# Patient Record
Sex: FEMALE | Race: BLACK OR AFRICAN AMERICAN | NOT HISPANIC OR LATINO | Employment: FULL TIME | URBAN - METROPOLITAN AREA
[De-identification: names, ages, dates, MRNs, and addresses within clinical notes are randomized per-mention and may not be internally consistent; named-entity substitution may affect disease eponyms.]

---

## 2020-07-19 ENCOUNTER — HOSPITAL ENCOUNTER (EMERGENCY)
Facility: HOSPITAL | Age: 65
Discharge: HOME | End: 2020-07-19
Attending: EMERGENCY MEDICINE
Payer: COMMERCIAL

## 2020-07-19 VITALS
BODY MASS INDEX: 34.66 KG/M2 | OXYGEN SATURATION: 96 % | HEART RATE: 72 BPM | RESPIRATION RATE: 25 BRPM | TEMPERATURE: 100 F | HEIGHT: 65 IN | SYSTOLIC BLOOD PRESSURE: 139 MMHG | WEIGHT: 208 LBS | DIASTOLIC BLOOD PRESSURE: 75 MMHG

## 2020-07-19 DIAGNOSIS — Z20.822 SUSPECTED COVID-19 VIRUS INFECTION: ICD-10-CM

## 2020-07-19 DIAGNOSIS — R05.9 COUGH: Primary | ICD-10-CM

## 2020-07-19 LAB
ALBUMIN SERPL BCP-MCNC: 3.2 G/DL (ref 3.5–5.2)
ALP SERPL-CCNC: 57 U/L (ref 55–135)
ALT SERPL W/O P-5'-P-CCNC: 10 U/L (ref 10–44)
ANION GAP SERPL CALC-SCNC: 11 MMOL/L (ref 8–16)
AST SERPL-CCNC: 25 U/L (ref 10–40)
BASOPHILS # BLD AUTO: 0.02 K/UL (ref 0–0.2)
BASOPHILS NFR BLD: 0.3 % (ref 0–1.9)
BILIRUB SERPL-MCNC: 0.4 MG/DL (ref 0.1–1)
BUN SERPL-MCNC: 14 MG/DL (ref 8–23)
CALCIUM SERPL-MCNC: 9 MG/DL (ref 8.7–10.5)
CHLORIDE SERPL-SCNC: 98 MMOL/L (ref 95–110)
CK SERPL-CCNC: 84 U/L (ref 20–180)
CO2 SERPL-SCNC: 25 MMOL/L (ref 23–29)
CREAT SERPL-MCNC: 1.1 MG/DL (ref 0.5–1.4)
CRP SERPL-MCNC: 108.1 MG/L (ref 0–8.2)
DIFFERENTIAL METHOD: ABNORMAL
EOSINOPHIL # BLD AUTO: 0 K/UL (ref 0–0.5)
EOSINOPHIL NFR BLD: 0.3 % (ref 0–8)
ERYTHROCYTE [DISTWIDTH] IN BLOOD BY AUTOMATED COUNT: 12.9 % (ref 11.5–14.5)
EST. GFR  (AFRICAN AMERICAN): >60 ML/MIN/1.73 M^2
EST. GFR  (NON AFRICAN AMERICAN): 53 ML/MIN/1.73 M^2
FERRITIN SERPL-MCNC: 212 NG/ML (ref 20–300)
GLUCOSE SERPL-MCNC: 148 MG/DL (ref 70–110)
HCT VFR BLD AUTO: 35.3 % (ref 37–48.5)
HGB BLD-MCNC: 11.1 G/DL (ref 12–16)
IMM GRANULOCYTES # BLD AUTO: 0.02 K/UL (ref 0–0.04)
IMM GRANULOCYTES NFR BLD AUTO: 0.3 % (ref 0–0.5)
LACTATE SERPL-SCNC: 1 MMOL/L (ref 0.5–2.2)
LDH SERPL L TO P-CCNC: 254 U/L (ref 110–260)
LYMPHOCYTES # BLD AUTO: 1.7 K/UL (ref 1–4.8)
LYMPHOCYTES NFR BLD: 23.3 % (ref 18–48)
MCH RBC QN AUTO: 27.4 PG (ref 27–31)
MCHC RBC AUTO-ENTMCNC: 31.4 G/DL (ref 32–36)
MCV RBC AUTO: 87 FL (ref 82–98)
MONOCYTES # BLD AUTO: 0.5 K/UL (ref 0.3–1)
MONOCYTES NFR BLD: 7.4 % (ref 4–15)
NEUTROPHILS # BLD AUTO: 4.9 K/UL (ref 1.8–7.7)
NEUTROPHILS NFR BLD: 68.4 % (ref 38–73)
NRBC BLD-RTO: 0 /100 WBC
PLATELET # BLD AUTO: 234 K/UL (ref 150–350)
PMV BLD AUTO: 9.3 FL (ref 9.2–12.9)
POTASSIUM SERPL-SCNC: 4.6 MMOL/L (ref 3.5–5.1)
PROCALCITONIN SERPL IA-MCNC: 0.07 NG/ML
PROT SERPL-MCNC: 7.2 G/DL (ref 6–8.4)
RBC # BLD AUTO: 4.05 M/UL (ref 4–5.4)
SODIUM SERPL-SCNC: 134 MMOL/L (ref 136–145)
TROPONIN I SERPL DL<=0.01 NG/ML-MCNC: <0.006 NG/ML (ref 0–0.03)
WBC # BLD AUTO: 7.13 K/UL (ref 3.9–12.7)

## 2020-07-19 PROCEDURE — 84145 PROCALCITONIN (PCT): CPT

## 2020-07-19 PROCEDURE — 82550 ASSAY OF CK (CPK): CPT

## 2020-07-19 PROCEDURE — 86140 C-REACTIVE PROTEIN: CPT

## 2020-07-19 PROCEDURE — 83605 ASSAY OF LACTIC ACID: CPT

## 2020-07-19 PROCEDURE — 80053 COMPREHEN METABOLIC PANEL: CPT

## 2020-07-19 PROCEDURE — 84484 ASSAY OF TROPONIN QUANT: CPT

## 2020-07-19 PROCEDURE — 83615 LACTATE (LD) (LDH) ENZYME: CPT

## 2020-07-19 PROCEDURE — 82728 ASSAY OF FERRITIN: CPT

## 2020-07-19 PROCEDURE — 93010 ELECTROCARDIOGRAM REPORT: CPT | Mod: ,,, | Performed by: INTERNAL MEDICINE

## 2020-07-19 PROCEDURE — 99285 EMERGENCY DEPT VISIT HI MDM: CPT | Mod: 25

## 2020-07-19 PROCEDURE — 93005 ELECTROCARDIOGRAM TRACING: CPT

## 2020-07-19 PROCEDURE — 93010 EKG 12-LEAD: ICD-10-PCS | Mod: ,,, | Performed by: INTERNAL MEDICINE

## 2020-07-19 PROCEDURE — 85025 COMPLETE CBC W/AUTO DIFF WBC: CPT

## 2020-07-19 RX ORDER — BENZONATATE 100 MG/1
100 CAPSULE ORAL 3 TIMES DAILY PRN
Qty: 20 CAPSULE | Refills: 0 | Status: SHIPPED | OUTPATIENT
Start: 2020-07-19 | End: 2020-07-29

## 2020-07-19 NOTE — ED PROVIDER NOTES
"Encounter Date: 7/19/2020    SCRIBE #1 NOTE: I, Annie Monroy, am scribing for, and in the presence of,  Lanette Carrillo MD. I have scribed the following portions of the note - Other sections scribed: HPI, ROS, PE.       History     Chief Complaint   Patient presents with    COVID-19 Concerns     Pt reports she was dx with covid-19 on 7/15/2020. Pt reports her oxygen at home has been dropping to the 80s and she was told by her doctor to go to ED if this starts to happen. O2 sat 97% at rest in triage. Pt c/o cough. Denies pain at this time     CC: Cough    HPI: This is a 64 y.o. female with a PMHx of hypertension, hyperlipidemia, and diabetes mellitus who presents to the Emergency Department with a cc of a cough for about 1.5 weeks. Patient states she was diagnosed with COVID-19 four days ago (7/15) and had been experiencing symptoms for about one week prior. She also notes her O2 sats had been intermittently dropping into the 80s on her home device but reports "it is all over the place." She reports associated mild shortness of breath with exertion and fatigue while outside that was alleviated with water. The SOB has been improving.  Denies nausea, vomiting, diarrhea, chest pain, or abdominal pain. Patient states her symptoms have been progressively improving. She is not a smoker and has no known drug allergies.    The history is provided by the patient. No  was used.     Review of patient's allergies indicates:  No Known Allergies  Past Medical History:   Diagnosis Date    Arthritis     Cataract     Diabetes mellitus     Diabetic retinopathy     Glaucoma     Hypertension      Past Surgical History:   Procedure Laterality Date    CATARACT EXTRACTION      DILATION AND CURETTAGE OF UTERUS      dnc      EYE SURGERY       Family History   Problem Relation Age of Onset    Cataracts Mother     Diabetes Mother     Hypertension Mother     Stroke Father     Thyroid disease Sister     " Heart disease Paternal Grandfather     Amblyopia Neg Hx     Blindness Neg Hx     Cancer Neg Hx     Glaucoma Neg Hx     Macular degeneration Neg Hx     Retinal detachment Neg Hx     Strabismus Neg Hx      Social History     Tobacco Use    Smoking status: Never Smoker   Substance Use Topics    Alcohol use: No    Drug use: No     Review of Systems   Constitutional: Positive for fatigue. Negative for chills, diaphoresis and fever.   HENT: Negative for congestion, rhinorrhea and sore throat.    Eyes: Negative for photophobia and visual disturbance.   Respiratory: Positive for cough and shortness of breath.    Cardiovascular: Negative for chest pain and leg swelling.   Gastrointestinal: Negative for abdominal pain, blood in stool, constipation, diarrhea, nausea and vomiting.   Genitourinary: Negative for dysuria, flank pain, frequency, hematuria and urgency.   Musculoskeletal: Negative for back pain, neck pain and neck stiffness.   Skin: Negative for rash and wound.   Neurological: Negative for dizziness, weakness, light-headedness, numbness and headaches.   Psychiatric/Behavioral: Negative for confusion and suicidal ideas.   All other systems reviewed and are negative.      Physical Exam     Initial Vitals [07/19/20 1236]   BP Pulse Resp Temp SpO2   129/67 82 18 98.8 °F (37.1 °C) 97 %      MAP       --         Physical Exam    Nursing note and vitals reviewed.  Constitutional: She appears well-developed and well-nourished. She is not diaphoretic. No distress.   HENT:   Head: Normocephalic and atraumatic.   Right Ear: External ear normal.   Left Ear: External ear normal.   Mouth/Throat: Oropharynx is clear and moist. No oropharyngeal exudate.   Eyes: Conjunctivae and EOM are normal. Pupils are equal, round, and reactive to light. Right eye exhibits no discharge. Left eye exhibits no discharge.   Neck: Normal range of motion. Neck supple. No JVD present.   Cardiovascular: Normal rate, regular rhythm, normal heart  sounds and intact distal pulses. Exam reveals no gallop and no friction rub.    No murmur heard.  Pulmonary/Chest: Breath sounds normal. No respiratory distress. She has no wheezes. She has no rhonchi. She has no rales.   O2 sats are between 99 and 100% on room air, no respiratory distress.    Abdominal: Soft. Bowel sounds are normal. She exhibits no distension. There is no abdominal tenderness. There is no rebound and no guarding.   Musculoskeletal: Normal range of motion. No tenderness or edema.   Lymphadenopathy:     She has no cervical adenopathy.   Neurological: She is alert and oriented to person, place, and time. No cranial nerve deficit.   Skin: Skin is warm and dry. Capillary refill takes less than 2 seconds.   Psychiatric: She has a normal mood and affect. Her behavior is normal. Thought content normal.         ED Course   Procedures  Labs Reviewed   CBC W/ AUTO DIFFERENTIAL - Abnormal; Notable for the following components:       Result Value    Hemoglobin 11.1 (*)     Hematocrit 35.3 (*)     Mean Corpuscular Hemoglobin Conc 31.4 (*)     All other components within normal limits   COMPREHENSIVE METABOLIC PANEL - Abnormal; Notable for the following components:    Sodium 134 (*)     Glucose 148 (*)     Albumin 3.2 (*)     eGFR if non  53 (*)     All other components within normal limits   C-REACTIVE PROTEIN - Abnormal; Notable for the following components:    .1 (*)     All other components within normal limits   FERRITIN   LACTATE DEHYDROGENASE   CK   LACTIC ACID, PLASMA   TROPONIN I   PROCALCITONIN     EKG Readings: (Independently Interpreted)   Normal sinus rhythm at 75 no ST elevation or depression.  Normal axis.  Normal intervals.  QTC is 406.  T-wave inversion is 3 and V1.     ECG Results          EKG 12-lead (In process)  Result time 07/19/20 16:49:02    In process by Interface, Lab In [facility] (07/19/20 16:49:02)                 Narrative:    Test Reason : R68.89,    Vent. Rate  : 075 BPM     Atrial Rate : 075 BPM     P-R Int : 184 ms          QRS Dur : 080 ms      QT Int : 364 ms       P-R-T Axes : 053 042 024 degrees     QTc Int : 406 ms    Normal sinus rhythm  Normal ECG  When compared with ECG of 29-NOV-2012 14:31,  No significant change was found    Referred By: [email or web address]   SELF           Confirmed By:                   In process by Interface, Lab In [place] (07/19/20 16:41:22)                 Narrative:    Test Reason : R68.89,    Vent. Rate : 075 BPM     Atrial Rate : 075 BPM     P-R Int : 184 ms          QRS Dur : 080 ms      QT Int : 364 ms       P-R-T Axes : 053 042 024 degrees     QTc Int : 406 ms    Normal sinus rhythm  Normal ECG  When compared with ECG of 29-NOV-2012 14:31,  No significant change was found    Referred By: [email or web address]   SELF           Confirmed By:                             Imaging Results           X-Ray Chest AP Portable (Final result)  Result time 07/19/20 13:55:03    Final result by Lasha Cohen MD (07/19/20 13:55:03)                 Impression:      No convincing radiographic evidence of pneumonia or other source of cough/shortness of breath, noting that early/mild viral pneumonia may be radiographically occult.  Specifically, no focal consolidation.    Right lung base interval 2 small nodules which could represent vessels on end but remains indeterminate.  Further evaluation with elective/nonemergent CT thorax can be obtained, if 2 year stability cannot be confirmed.    This report was flagged in Epic as abnormal.      Electronically signed by: Lasha Cohen MD  Date:    07/19/2020  Time:    13:55             Narrative:    EXAMINATION:  XR CHEST AP PORTABLE    CLINICAL HISTORY:  Suspected Covid-19 Virus Infection;    TECHNIQUE:  Single frontal view of the chest was performed.    COMPARISON:  Chest radiograph 11/29/2012    FINDINGS:  Monitoring leads overlie the chest.  Resolution is limited by body habitus with underpenetration.  Chronic mild  nonspecific elevation of the right hemidiaphragm.  Cardiomediastinal silhouette is midline and mildly prominent similar to prior, without evidence of failure.  Suspected minimal calcific atherosclerosis of the arch and slight tortuosity of the aorta.  Two small nodular densities projected over the right lung base not definitively seen on prior.  Left basilar linear opacities suggesting subsegmental scarring versus atelectasis.  The lungs are otherwise well expanded without large consolidation, pleural effusion or pneumothorax.  No acute osseous process seen.                            MDM  MDM:    64 y.o.female with PMHx as noted above presents with Covid +, concerned that O2 sats were reading low on exam. Physical exam remarkable for non-distressed appearing female, conversing with ease, no peripheral edema noted, abdomen soft, nt/nd, normal rate, in no overt respiratory distress.  ED workup remarkable for CXR -unremarkable however possible nodule, I discussed this with patient and recommended need for CT scan with PCP to rule out cancer.  Pt presentation consistent with COVID19 infection, with symptoms consistent as noted above.  At this time given patient's history, physical exam, and ED workup do not suspect acute PE, acute MI/ACS, PTX, CHF/COPD exacerbation, bacterial PNA, septic shock, acute respiratory failure, or any further malignant cause.      Patient home O2 finger monitor inconsistent on our SPO2 monitor. Patient states when in the 80s at home, she was moving her hand around and cooking. Think possibly inaccurate. Patient was ambulated multiple times with no hypoxia or respiratory distress noted. She was monitored for multiple hours in our ED without respiratory distress or hypoxia. I discussed this with patient proper use of home O2 monitor. Very strict return precautions given for patient for any shortness of breath, or SPO2 decrease at home with proper use of home SPO2 monitor. Patient is in  agreement with plan and does not want to be admitted at this time, will go home and monitor symptoms at home.     Pt advised on conservative therapies at home including tylenol, increased PO fluids.  At time of reassessment patient in no acute respiratory distress, not meeting criteria for hospitalization or further oxygen administration. Discussed diagnosis and further treatment with patient, including f/u with PCP in the next few days.  Return precautions given and all questions answered.  Patient in understanding of plan, including plans for home quarantine.  Pt discharged to home improved and stable.                    Scribe Attestation:   Scribe #1: I performed the above scribed service and the documentation accurately describes the services I performed. I attest to the accuracy of the note.                          Clinical Impression:     1. Cough    2. Suspected Covid-19 Virus Infection                ED Disposition Condition    Discharge Stable        ED Prescriptions     Medication Sig Dispense Start Date End Date Auth. Provider    benzonatate (TESSALON) 100 MG capsule Take 1 capsule (100 mg total) by mouth 3 (three) times daily as needed for Cough. 20 capsule 7/19/2020 7/29/2020 Lanette Carrillo MD        Follow-up Information     Follow up With Specialties Details Why Contact Info    Marlen Bruce MD Family Medicine Schedule an appointment as soon as possible for a visit in 2 days to discuss recent ED visit, to establish primary doctor [address] [facility] [address]  [facility] [address]  [phone]      [facility] Emergency Medicine  As needed, If symptoms worsen [address] [place] [id number] [phone]                      I, Lanette Carrillo, personally performed the services described in this documentation. All medical record entries made by the scribe were at my direction and in my presence. I have reviewed the chart and agree that the record reflects my  personal performance and is accurate and complete.                 Lanette Carrillo MD  07/19/20 2047

## 2020-07-19 NOTE — DISCHARGE INSTRUCTIONS
Please return for oxygen saturations < 92%, any worsening shortness of breath, or any other concerns. Please call your primary care doctor tomorrow to discuss recent ED visit. Please isolate until symptoms resolve, at least 10 days from when symptoms started and 72 hours from your last fever (without using fever reducing medicines).     Thank you for coming to our Emergency Department today. It is important to remember that some problems are difficult to diagnose and may not be found during your first visit. Be sure to follow up with your primary care doctor and review any labs/imaging that was performed with them. If you do not have a primary care doctor, you may contact the one listed on your discharge paperwork or you may also call the [facility] Appointment Desk at [phone] to schedule an appointment with one.     All medications may potentially have side effects and it is impossible to predict which medications may give you side effects. If you feel that you are having a negative effect of any medication you should immediately stop taking them and seek medical attention.    Return to the ER with any questions/concerns, new/concerning symptoms, worsening or failure to improve. Do not drive or make any important decisions for 24 hours if you have received any pain medications, sedatives or mood altering drugs during your ER visit.

## 2020-07-19 NOTE — ED TRIAGE NOTES
Pt presents to ED with c/o COVID concerns. Pt tested positive 4 days ago. Pt states O2 sats having been intermittently dropping to the 80s. Pt states SOB with ADLs. Pt room air baseline. Pt also complaining of cough that is sometimes productive. Pt's PCP told pt to come to ED. Pt denies CP, fever, chills, or n/v/d.

## 2020-07-19 NOTE — ED NOTES
Pt ambulatory to discharge, NAD. Pt received one rx for tessalon pearls. Pt verbalized understanding of discharge instructions and of symptoms that would warrant return to the ED. All personal belongings with the patient at the time of discharge.

## 2022-06-17 ENCOUNTER — HOSPITAL ENCOUNTER (EMERGENCY)
Facility: HOSPITAL | Age: 67
Discharge: HOME | End: 2022-06-17
Attending: EMERGENCY MEDICINE
Payer: MEDICARE

## 2022-06-17 VITALS
HEIGHT: 64 IN | TEMPERATURE: 98 F | HEART RATE: 74 BPM | WEIGHT: 140 LBS | DIASTOLIC BLOOD PRESSURE: 70 MMHG | SYSTOLIC BLOOD PRESSURE: 149 MMHG | BODY MASS INDEX: 23.9 KG/M2 | RESPIRATION RATE: 18 BRPM | OXYGEN SATURATION: 98 %

## 2022-06-17 DIAGNOSIS — S00.03XA CONTUSION OF SCALP, INITIAL ENCOUNTER: Primary | ICD-10-CM

## 2022-06-17 PROCEDURE — 99284 EMERGENCY DEPT VISIT MOD MDM: CPT | Mod: 25

## 2022-06-18 NOTE — ED PROVIDER NOTES
Encounter Date: 6/17/2022       History     Chief Complaint   Patient presents with    Fall     Pt c/c fall from standing position backwards hitting head on bricks. Pt denies LOC, visual changes, bleeding or blood thinner use. Pt c/c slight headache and bump on back of head.     66-YEAR-OLD FEMALE WITH HISTORY OF HYPERTENSION, DIABETES, DIABETIC RETINOPATHY, GLAUCOMA PRESENTS COMPLAINING FALLING AND STRIKING HER HEAD.  IT WAS A SLIP AND FALL.  SHE DID NOT LOSE CONSCIOUS.  NO CHANGE IN VISION.  NO NUMBNESS OR TINGLING IN ARMS OR LEGS.  NO NECK OR BACK PAIN.  NO CHEST PAIN OR SHORTNESS OF BREATH.  NO GI SYMPTOMS.  HEADACHE IS 2/10.  DENIES TAKING ASPIRIN DAILY OR BEING ON BLOOD THINNERS.        Review of patient's allergies indicates:  No Known Allergies  Past Medical History:   Diagnosis Date    Arthritis     Cataract     Diabetes mellitus     Diabetic retinopathy     Glaucoma     Hypertension      Past Surgical History:   Procedure Laterality Date    CATARACT EXTRACTION      DILATION AND CURETTAGE OF UTERUS      dnc      EYE SURGERY       Family History   Problem Relation Age of Onset    Cataracts Mother     Diabetes Mother     Hypertension Mother     Stroke Father     Thyroid disease Sister     Heart disease Paternal Grandfather     Amblyopia Neg Hx     Blindness Neg Hx     Cancer Neg Hx     Glaucoma Neg Hx     Macular degeneration Neg Hx     Retinal detachment Neg Hx     Strabismus Neg Hx      Social History     Tobacco Use    Smoking status: Never Smoker    Smokeless tobacco: Never Used   Substance Use Topics    Alcohol use: No    Drug use: No     Review of Systems   Constitutional: Negative for fever.   HENT: Negative for sore throat.    Eyes: Negative for visual disturbance.   Respiratory: Negative for shortness of breath.    Cardiovascular: Negative for chest pain.   Gastrointestinal: Negative for abdominal pain.   Genitourinary: Negative for difficulty urinating.   Musculoskeletal:  Negative for back pain.   Skin: Negative for rash.   Neurological: Positive for headaches. Negative for dizziness, tremors, seizures, syncope, speech difficulty, weakness, light-headedness and numbness.       Physical Exam     Initial Vitals [06/17/22 2032]   BP Pulse Resp Temp SpO2   (!) 142/84 104 18 98.2 °F (36.8 °C) 97 %      MAP       --         Physical Exam    Nursing note and vitals reviewed.  Constitutional: She appears well-developed and well-nourished.   HENT:   Right Ear: External ear normal.   Left Ear: External ear normal.   Nose: Nose normal.   Mouth/Throat: Oropharynx is clear and moist.   OCCIPITAL HEMATOMA.  NO LACERATION.   Eyes: EOM are normal. Pupils are equal, round, and reactive to light.   Neck: Neck supple. No thyromegaly present. No JVD present.   NO MIDLINE TENDERNESS.  FULL RANGE OF MOTION.   Normal range of motion.  Cardiovascular: Normal rate, regular rhythm, normal heart sounds and intact distal pulses. Exam reveals no gallop and no friction rub.    No murmur heard.  Pulmonary/Chest: Breath sounds normal. No respiratory distress. She exhibits no tenderness.   Abdominal: Abdomen is soft. Bowel sounds are normal. There is no abdominal tenderness.   Musculoskeletal:         General: No tenderness or edema. Normal range of motion.      Cervical back: Normal range of motion and neck supple.     Neurological: She is alert and oriented to person, place, and time. She has normal strength and normal reflexes. She displays normal reflexes. No cranial nerve deficit or sensory deficit.   Skin: Skin is warm and dry. Capillary refill takes less than 2 seconds.         ED Course   Procedures  Labs Reviewed - No data to display       Imaging Results          CT Head Without Contrast (Final result)  Result time 06/17/22 22:48:44    Final result by Jordan Hobbs MD (06/17/22 22:48:44)                 Impression:      No acute intracranial abnormalities identified.      Electronically signed  by: Jordan Hobbs MD  Date:    06/17/2022  Time:    22:48             Narrative:    EXAMINATION:  CT HEAD WITHOUT CONTRAST    CLINICAL HISTORY:  Head trauma, minor (Age >= 65y);    TECHNIQUE:  Low dose axial images were obtained through the head.  Coronal and sagittal reformations were also performed. Contrast was not administered.    COMPARISON:  None.    FINDINGS:  No evidence of acute/recent major vascular distribution cerebral infarction, intraparenchymal hemorrhage, or intra-axial space occupying lesion. The ventricular system is normal in size and configuration with no evidence of hydrocephalus. No effacement of the skull-base cisterns. No abnormal extra-axial fluid collections or blood products. Visualized paranasal sinuses and mastoid air cells are clear. The calvarium shows no significant abnormality.                            PATIENT DOES NOT MEET CRITERIA FOR HEAD CT OTHER THAN SHE IS OLDER THAN 65 YEARS OLD.  PATIENT STATES SHE IS UNCOMFORTABLE SELF OBSERVING AT HOME AND IS REQUESTING IMAGING.  WILL OBTAIN CT HEAD.  EXPECTED TO BE NORMAL.  WILL BE FOLLOWED BY ONCOMING PROVIDER.  I WILL INFORM THE PATIENT THAT SHE WILL BE DISCHARGED THE HEAD CT IS NORMAL.  BUT THAT SHE WILL HAVE THE ABILITY TO ASK QUESTIONS IF SHE HAS ANY AND I WILL TELL THE ONCOMING PROVIDER ABOUT HER IN CASE SHE WISHES SPEAK WITH THEM     Medications - No data to display                    Assumed care patient from Dr. Holder, pending CT scan.  CT scan unremarkable.  Patient without any gross motor noted neuro deficit, stable gait, mild tenderness occipitally.  Advised on further symptomatic therapies at home.  Do not suspect any acute surgical or medical emergency.  Patient discharged home in stable condition.         Clinical Impression:   Final diagnoses:  [S00.03XA] Contusion of scalp, initial encounter (Primary)          ED Disposition Condition    Discharge Stable        ED Prescriptions     None        Follow-up Information      Follow up With Specialties Details Why Contact Info    [facility] Emergency Dept Emergency Medicine  As needed [address]  [facility] [address] [phone]           Van Holder MD  06/17/22 222       Efrain Lamb MD  06/18/22 1930

## 2022-06-18 NOTE — ED TRIAGE NOTES
Patient reports to ED after fall, approx at 6:45 PM. Patient states she tripped over rocks and fell backwards, hitting her head on a brick wall.    Patient complaining of posterior head and neck pain, pain 2/10.     Patient denies LOC and use of blood thinners.

## 2023-02-01 LAB — CRC RECOMMENDATION EXT: NORMAL

## 2024-05-29 LAB — BMD RECOMMENDATION EXT: NORMAL

## 2025-04-14 LAB — BCS RECOMMENDATION EXT: NORMAL

## 2025-05-28 DIAGNOSIS — Z78.0 MENOPAUSE: ICD-10-CM

## 2025-05-28 DIAGNOSIS — I10 IDIOPATHIC HYPERTENSION: ICD-10-CM

## 2025-05-29 ENCOUNTER — LAB VISIT (OUTPATIENT)
Dept: LAB | Facility: HOSPITAL | Age: 70
End: 2025-05-29
Attending: INTERNAL MEDICINE
Payer: MEDICARE

## 2025-05-29 ENCOUNTER — RESULTS FOLLOW-UP (OUTPATIENT)
Dept: FAMILY MEDICINE | Facility: CLINIC | Age: 70
End: 2025-05-29

## 2025-05-29 DIAGNOSIS — I10 IDIOPATHIC HYPERTENSION: ICD-10-CM

## 2025-05-29 LAB
ALBUMIN SERPL BCP-MCNC: 3.6 G/DL (ref 3.5–5.2)
ALP SERPL-CCNC: 68 UNIT/L (ref 40–150)
ALT SERPL W/O P-5'-P-CCNC: 16 UNIT/L (ref 10–44)
ANION GAP (OHS): 10 MMOL/L (ref 8–16)
AST SERPL-CCNC: 22 UNIT/L (ref 11–45)
BILIRUB SERPL-MCNC: 0.3 MG/DL (ref 0.1–1)
BUN SERPL-MCNC: 16 MG/DL (ref 8–23)
CALCIUM SERPL-MCNC: 9 MG/DL (ref 8.7–10.5)
CHLORIDE SERPL-SCNC: 110 MMOL/L (ref 95–110)
CO2 SERPL-SCNC: 22 MMOL/L (ref 23–29)
CREAT SERPL-MCNC: 1 MG/DL (ref 0.5–1.4)
GFR SERPLBLD CREATININE-BSD FMLA CKD-EPI: >60 ML/MIN/1.73/M2
GLUCOSE SERPL-MCNC: 107 MG/DL (ref 70–110)
POTASSIUM SERPL-SCNC: 4.4 MMOL/L (ref 3.5–5.1)
PROT SERPL-MCNC: 7.4 GM/DL (ref 6–8.4)
SODIUM SERPL-SCNC: 142 MMOL/L (ref 136–145)

## 2025-05-29 PROCEDURE — 80053 COMPREHEN METABOLIC PANEL: CPT

## 2025-05-29 PROCEDURE — 36415 COLL VENOUS BLD VENIPUNCTURE: CPT

## 2025-06-03 ENCOUNTER — OFFICE VISIT (OUTPATIENT)
Dept: FAMILY MEDICINE | Facility: CLINIC | Age: 70
End: 2025-06-03
Payer: MEDICARE

## 2025-06-03 ENCOUNTER — LAB VISIT (OUTPATIENT)
Dept: LAB | Facility: HOSPITAL | Age: 70
End: 2025-06-03
Attending: INTERNAL MEDICINE
Payer: MEDICARE

## 2025-06-03 VITALS
TEMPERATURE: 98 F | SYSTOLIC BLOOD PRESSURE: 120 MMHG | DIASTOLIC BLOOD PRESSURE: 70 MMHG | HEART RATE: 64 BPM | OXYGEN SATURATION: 97 % | BODY MASS INDEX: 41.66 KG/M2 | WEIGHT: 242.75 LBS

## 2025-06-03 DIAGNOSIS — Z11.59 NEED FOR HEPATITIS C SCREENING TEST: ICD-10-CM

## 2025-06-03 DIAGNOSIS — E11.00 TYPE 2 DIABETES MELLITUS WITH HYPEROSMOLARITY WITHOUT COMA, WITHOUT LONG-TERM CURRENT USE OF INSULIN: ICD-10-CM

## 2025-06-03 DIAGNOSIS — E78.5 HYPERLIPIDEMIA ASSOCIATED WITH TYPE 2 DIABETES MELLITUS: ICD-10-CM

## 2025-06-03 DIAGNOSIS — I10 HTN (HYPERTENSION), BENIGN: ICD-10-CM

## 2025-06-03 DIAGNOSIS — E11.69 HYPERLIPIDEMIA ASSOCIATED WITH TYPE 2 DIABETES MELLITUS: ICD-10-CM

## 2025-06-03 DIAGNOSIS — Z00.00 ANNUAL PHYSICAL EXAM: Primary | ICD-10-CM

## 2025-06-03 PROCEDURE — 1126F AMNT PAIN NOTED NONE PRSNT: CPT | Mod: CPTII,S$GLB,, | Performed by: INTERNAL MEDICINE

## 2025-06-03 PROCEDURE — 99387 INIT PM E/M NEW PAT 65+ YRS: CPT | Mod: S$GLB,,, | Performed by: INTERNAL MEDICINE

## 2025-06-03 PROCEDURE — 1101F PT FALLS ASSESS-DOCD LE1/YR: CPT | Mod: CPTII,S$GLB,, | Performed by: INTERNAL MEDICINE

## 2025-06-03 PROCEDURE — 3288F FALL RISK ASSESSMENT DOCD: CPT | Mod: CPTII,S$GLB,, | Performed by: INTERNAL MEDICINE

## 2025-06-03 PROCEDURE — 1160F RVW MEDS BY RX/DR IN RCRD: CPT | Mod: CPTII,S$GLB,, | Performed by: INTERNAL MEDICINE

## 2025-06-03 PROCEDURE — 3078F DIAST BP <80 MM HG: CPT | Mod: CPTII,S$GLB,, | Performed by: INTERNAL MEDICINE

## 2025-06-03 PROCEDURE — 3074F SYST BP LT 130 MM HG: CPT | Mod: CPTII,S$GLB,, | Performed by: INTERNAL MEDICINE

## 2025-06-03 PROCEDURE — 4010F ACE/ARB THERAPY RXD/TAKEN: CPT | Mod: CPTII,S$GLB,, | Performed by: INTERNAL MEDICINE

## 2025-06-03 PROCEDURE — 99999 PR PBB SHADOW E&M-EST. PATIENT-LVL IV: CPT | Mod: PBBFAC,,, | Performed by: INTERNAL MEDICINE

## 2025-06-03 PROCEDURE — 3008F BODY MASS INDEX DOCD: CPT | Mod: CPTII,S$GLB,, | Performed by: INTERNAL MEDICINE

## 2025-06-03 PROCEDURE — 1159F MED LIST DOCD IN RCRD: CPT | Mod: CPTII,S$GLB,, | Performed by: INTERNAL MEDICINE

## 2025-06-03 PROCEDURE — 82570 ASSAY OF URINE CREATININE: CPT

## 2025-06-03 RX ORDER — ESOMEPRAZOLE MAGNESIUM 40 MG/1
40 CAPSULE, DELAYED RELEASE ORAL
COMMUNITY

## 2025-06-03 RX ORDER — METFORMIN HYDROCHLORIDE 500 MG/1
500 TABLET ORAL 2 TIMES DAILY WITH MEALS
COMMUNITY

## 2025-06-03 RX ORDER — LISINOPRIL 5 MG/1
5 TABLET ORAL DAILY
COMMUNITY

## 2025-06-03 RX ORDER — GABAPENTIN 300 MG/1
300 CAPSULE ORAL 3 TIMES DAILY
COMMUNITY

## 2025-06-03 RX ORDER — FUROSEMIDE 20 MG/1
20 TABLET ORAL DAILY PRN
COMMUNITY

## 2025-06-04 ENCOUNTER — RESULTS FOLLOW-UP (OUTPATIENT)
Dept: FAMILY MEDICINE | Facility: CLINIC | Age: 70
End: 2025-06-04

## 2025-06-04 LAB
ALBUMIN/CREAT UR: 15 UG/MG
CREAT UR-MCNC: 40 MG/DL (ref 15–325)
MICROALBUMIN UR-MCNC: 6 UG/ML (ref ?–5000)

## 2025-06-10 ENCOUNTER — PATIENT OUTREACH (OUTPATIENT)
Dept: ADMINISTRATIVE | Facility: HOSPITAL | Age: 70
End: 2025-06-10
Payer: MEDICARE

## 2025-06-12 ENCOUNTER — TELEPHONE (OUTPATIENT)
Dept: FAMILY MEDICINE | Facility: CLINIC | Age: 70
End: 2025-06-12

## 2025-06-17 ENCOUNTER — CLINICAL SUPPORT (OUTPATIENT)
Dept: FAMILY MEDICINE | Facility: CLINIC | Age: 70
End: 2025-06-17
Payer: MEDICARE

## 2025-06-17 VITALS — DIASTOLIC BLOOD PRESSURE: 64 MMHG | SYSTOLIC BLOOD PRESSURE: 134 MMHG

## 2025-06-17 DIAGNOSIS — I10 HTN (HYPERTENSION), BENIGN: Primary | ICD-10-CM

## 2025-06-17 PROCEDURE — 99499 UNLISTED E&M SERVICE: CPT | Mod: S$GLB,,, | Performed by: INTERNAL MEDICINE

## 2025-06-17 PROCEDURE — 99999 PR PBB SHADOW E&M-EST. PATIENT-LVL I: CPT | Mod: PBBFAC,,,

## 2025-06-17 NOTE — PROGRESS NOTES
Verenice Ruiz 69 y.o. female is here today for Blood Pressure check.   History of HTN yes.    Review of patient's allergies indicates:  No Known Allergies  Creatinine   Date Value Ref Range Status   05/29/2025 1.0 0.5 - 1.4 mg/dL Final     Comment:     [facility] Bulk Order     Sodium   Date Value Ref Range Status   05/29/2025 142 136 - 145 mmol/L Final     Comment:     [facility] Bulk Order   07/19/2020 134 (L) 136 - 145 mmol/L Final     Potassium   Date Value Ref Range Status   05/29/2025 4.4 3.5 - 5.1 mmol/L Final     Comment:     [facility] Bulk Order   07/19/2020 4.6 3.5 - 5.1 mmol/L Final   ]  Patient verifies taking blood pressure medications on a regular basis at the same time of the day.   Current Medications[1]  Does patient have record of home blood pressure readings yes. Readings have been averaging 127/70.   Last dose of blood pressure medication was taken at 06/16/2025 at 23:00.  Patient is asymptomatic.   Complains of no complaints at this time.    Patient sitting blood pressure in right arm 148/70, patient instructed to wait 15 minutes. Second reading in the right arm 134/64 while patient sitting.      Dr. Cuenca informed of nurse visit.           [1]   Current Outpatient Medications:     ascorbic acid (VITAMIN C ORAL), Take by mouth. (Patient not taking: Reported on 6/3/2025), Disp: , Rfl:     aspirin 162 MG TbEC, Take 1 tablet (162 mg total) by mouth once daily., Disp: 30 tablet, Rfl: 2    cyanocobalamin, vitamin B-12, (VITAMIN B-12 ORAL), Take by mouth., Disp: , Rfl:     esomeprazole (NEXIUM) 40 MG capsule, Take 40 mg by mouth before breakfast., Disp: , Rfl:     furosemide (LASIX) 20 MG tablet, Take 20 mg by mouth daily as needed (LEG SWELLING)., Disp: , Rfl:     gabapentin (NEURONTIN) 300 MG capsule, Take 300 mg by mouth 3 (three) times daily., Disp: , Rfl:     lisinopriL (PRINIVIL,ZESTRIL) 5 MG tablet, Take 5 mg by mouth once daily., Disp: , Rfl:     metFORMIN (GLUCOPHAGE) 500 MG tablet, Take 500  mg by mouth 2 (two) times daily with meals., Disp: , Rfl:     multivitamin capsule, Take by mouth. 1 Capsule Oral Every day, Disp: , Rfl:     OMEGA-3 FATTY ACIDS ORAL, Take by mouth. (Patient not taking: Reported on 6/3/2025), Disp: , Rfl:     simvastatin (ZOCOR) 40 MG tablet, Take 40 mg by mouth. 1 Tablet Oral At bedtime, Disp: , Rfl:     tirzepatide 12.5 mg/0.5 mL PnIj, Inject into the skin every 7 days., Disp: , Rfl:

## 2025-06-26 ENCOUNTER — PATIENT MESSAGE (OUTPATIENT)
Dept: FAMILY MEDICINE | Facility: CLINIC | Age: 70
End: 2025-06-26
Payer: MEDICARE

## 2025-06-26 DIAGNOSIS — E11.00 TYPE 2 DIABETES MELLITUS WITH HYPEROSMOLARITY WITHOUT COMA, WITHOUT LONG-TERM CURRENT USE OF INSULIN: Primary | ICD-10-CM

## 2025-06-26 RX ORDER — TIRZEPATIDE 15 MG/.5ML
15 INJECTION, SOLUTION SUBCUTANEOUS
Qty: 12 PEN | Refills: 0 | Status: SHIPPED | OUTPATIENT
Start: 2025-06-26

## 2025-06-26 NOTE — TELEPHONE ENCOUNTER
No care due was identified.  [facility] Embedded Care Due Messages. Reference number: [id number].   6/26/2025 10:40:11 AM CDT

## 2025-06-27 ENCOUNTER — PATIENT OUTREACH (OUTPATIENT)
Dept: ADMINISTRATIVE | Facility: HOSPITAL | Age: 70
End: 2025-06-27
Payer: MEDICARE

## 2025-08-14 RX ORDER — SIMVASTATIN 40 MG/1
40 TABLET, FILM COATED ORAL NIGHTLY
Qty: 90 TABLET | Refills: 0 | Status: SHIPPED | OUTPATIENT
Start: 2025-08-14

## 2025-08-15 RX ORDER — SIMVASTATIN 40 MG/1
40 TABLET, FILM COATED ORAL NIGHTLY
Qty: 90 TABLET | Refills: 0 | OUTPATIENT
Start: 2025-08-15

## 2025-08-25 DIAGNOSIS — Z00.00 ENCOUNTER FOR MEDICARE ANNUAL WELLNESS EXAM: ICD-10-CM
